# Patient Record
Sex: MALE | Race: WHITE | HISPANIC OR LATINO | Employment: FULL TIME | URBAN - METROPOLITAN AREA
[De-identification: names, ages, dates, MRNs, and addresses within clinical notes are randomized per-mention and may not be internally consistent; named-entity substitution may affect disease eponyms.]

---

## 2024-01-29 ENCOUNTER — OFFICE VISIT (OUTPATIENT)
Dept: FAMILY MEDICINE | Facility: CLINIC | Age: 54
End: 2024-01-29
Payer: COMMERCIAL

## 2024-01-29 VITALS
DIASTOLIC BLOOD PRESSURE: 91 MMHG | WEIGHT: 178 LBS | HEART RATE: 56 BPM | TEMPERATURE: 98 F | OXYGEN SATURATION: 96 % | RESPIRATION RATE: 18 BRPM | SYSTOLIC BLOOD PRESSURE: 143 MMHG

## 2024-01-29 DIAGNOSIS — R51.9 ACUTE NONINTRACTABLE HEADACHE, UNSPECIFIED HEADACHE TYPE: Primary | ICD-10-CM

## 2024-01-29 DIAGNOSIS — J06.9 VIRAL URI: ICD-10-CM

## 2024-01-29 PROCEDURE — 99203 OFFICE O/P NEW LOW 30 MIN: CPT | Performed by: PHYSICIAN ASSISTANT

## 2024-01-29 RX ORDER — ATORVASTATIN CALCIUM 10 MG/1
1 TABLET, FILM COATED ORAL AT BEDTIME
COMMUNITY
Start: 2023-06-20 | End: 2024-02-26

## 2024-01-29 RX ORDER — PANTOPRAZOLE SODIUM 20 MG/1
20 TABLET, DELAYED RELEASE ORAL DAILY
COMMUNITY
Start: 2023-06-26 | End: 2024-02-26

## 2024-01-29 RX ORDER — CHLORAL HYDRATE 500 MG
1000 CAPSULE ORAL
COMMUNITY

## 2024-01-29 RX ORDER — LISINOPRIL 5 MG/1
5 TABLET ORAL DAILY
COMMUNITY
Start: 2023-06-20 | End: 2024-02-26

## 2024-01-29 NOTE — PROGRESS NOTES
"Chief Complaint   Patient presents with    head pain     Pain in right side of head x2 weeks      Nasal Congestion     X3 days         ASSESSMENT/PLAN:  Amie was seen today for head pain and nasal congestion.    Diagnoses and all orders for this visit:    Acute nonintractable headache, unspecified headache type    Viral URI    Unclear cause of headache, considered trigeminal neuralgia, herpes, tension headache, migraine, mass among others.  He has a reassuring neuroexam at the moment.  Has not tried anything over-the-counter for it.  Otherwise normal exam.  Recommend Tylenol, ibuprofen, follow-up with PCP in 2 weeks if not improving, sooner if any new or worsening symptoms develop    Has a mild viral URI.  Symptomatic management recommended    Sabas Olivares PA-C      SUBJECTIVE:  Amie is a 53 year old male who presents to urgent care with pressure and the feeling of swelling in right side of the face. Symptoms started 2 weeks ago. Patient denies injury or pain stating \"its like swelling or a bruise on the side of my head\".  There has not been an asymmetry of the head.  No acute injury or trauma.  Patient denies fever, shortness of breath, cough, sore throat, or chest pain. Patient noted minor shortness of breath on Wednesday when playing soccer. Patient denies tingling, numbness, or ringing in the right ear. Patient also complains of nasal congestion that started yesterday (1/28/24).     ROS: Pertinent ROS neg other than the symptoms noted above in the HPI.     OBJECTIVE:  BP (!) 143/91 (BP Location: Right arm, Patient Position: Sitting, Cuff Size: Adult Large)   Pulse 56   Temp 98  F (36.7  C) (Oral)   Resp 18   Wt 80.7 kg (178 lb)   SpO2 96%    GENERAL: alert and no distress  EYES: Eyes grossly normal to inspection, PERRLA, no photophobia, extract movements intact  RESP: lungs clear to auscultation - no rales, rhonchi or wheezes  CV: regular rate and rhythm, normal S1 S2, no S3 or S4, no murmur, click or " rub, no peripheral edema   MS: no gross musculoskeletal defects noted, no edema. No swelling noted to face.  SKIN: no suspicious lesions or rashes, no tenderness of the head or neck  Neuro: Normal gait, cranial nerves II through XII intact, upper and lower EXTR strength 5/5    DIAGNOSTICS    No results found for any visits on 01/29/24.     Current Outpatient Medications   Medication    atorvastatin (LIPITOR) 10 MG tablet    fish oil-omega-3 fatty acids 1000 MG capsule    lisinopril (ZESTRIL) 5 MG tablet    pantoprazole (PROTONIX) 20 MG EC tablet     No current facility-administered medications for this visit.      There is no problem list on file for this patient.     No past medical history on file.  No past surgical history on file.  No family history on file.  Social History     Tobacco Use    Smoking status: Not on file    Smokeless tobacco: Not on file   Substance Use Topics    Alcohol use: Not on file              The plan of care was discussed with the patient. They understand and agree with the course of treatment prescribed. A printed summary was given including instructions and medications.  The use of Dragon/WineNice dictation services may have been used to construct the content in this note; any grammatical or spelling errors are non-intentional. Please contact the author of this note directly if you are in need of any clarification.

## 2024-02-19 SDOH — HEALTH STABILITY: PHYSICAL HEALTH: ON AVERAGE, HOW MANY MINUTES DO YOU ENGAGE IN EXERCISE AT THIS LEVEL?: 30 MIN

## 2024-02-19 SDOH — HEALTH STABILITY: PHYSICAL HEALTH: ON AVERAGE, HOW MANY DAYS PER WEEK DO YOU ENGAGE IN MODERATE TO STRENUOUS EXERCISE (LIKE A BRISK WALK)?: 5 DAYS

## 2024-02-19 ASSESSMENT — SOCIAL DETERMINANTS OF HEALTH (SDOH): HOW OFTEN DO YOU GET TOGETHER WITH FRIENDS OR RELATIVES?: ONCE A WEEK

## 2024-02-26 ENCOUNTER — OFFICE VISIT (OUTPATIENT)
Dept: FAMILY MEDICINE | Facility: CLINIC | Age: 54
End: 2024-02-26
Payer: COMMERCIAL

## 2024-02-26 VITALS
HEART RATE: 53 BPM | OXYGEN SATURATION: 96 % | TEMPERATURE: 98 F | DIASTOLIC BLOOD PRESSURE: 83 MMHG | SYSTOLIC BLOOD PRESSURE: 134 MMHG | HEIGHT: 65 IN | BODY MASS INDEX: 30.6 KG/M2 | WEIGHT: 183.7 LBS

## 2024-02-26 DIAGNOSIS — Z23 IMMUNIZATION DUE: ICD-10-CM

## 2024-02-26 DIAGNOSIS — R12 HEARTBURN: ICD-10-CM

## 2024-02-26 DIAGNOSIS — E78.2 MIXED HYPERLIPIDEMIA: ICD-10-CM

## 2024-02-26 DIAGNOSIS — Z11.4 SCREENING FOR HIV (HUMAN IMMUNODEFICIENCY VIRUS): ICD-10-CM

## 2024-02-26 DIAGNOSIS — Z00.00 ANNUAL PHYSICAL EXAM: Primary | ICD-10-CM

## 2024-02-26 DIAGNOSIS — I10 ESSENTIAL HYPERTENSION: ICD-10-CM

## 2024-02-26 DIAGNOSIS — Z11.59 NEED FOR HEPATITIS C SCREENING TEST: ICD-10-CM

## 2024-02-26 PROBLEM — M25.561 CHRONIC PAIN OF RIGHT KNEE: Status: RESOLVED | Noted: 2023-04-10 | Resolved: 2024-02-26

## 2024-02-26 PROBLEM — G89.29 CHRONIC PAIN OF RIGHT KNEE: Status: ACTIVE | Noted: 2023-04-10

## 2024-02-26 PROBLEM — M76.61 ACHILLES TENDINITIS OF RIGHT LOWER EXTREMITY: Status: RESOLVED | Noted: 2022-09-21 | Resolved: 2024-02-26

## 2024-02-26 PROBLEM — G89.29 CHRONIC PAIN OF RIGHT KNEE: Status: RESOLVED | Noted: 2023-04-10 | Resolved: 2024-02-26

## 2024-02-26 PROBLEM — M25.561 CHRONIC PAIN OF RIGHT KNEE: Status: ACTIVE | Noted: 2023-04-10

## 2024-02-26 PROBLEM — M76.61 ACHILLES TENDINITIS OF RIGHT LOWER EXTREMITY: Status: ACTIVE | Noted: 2022-09-21

## 2024-02-26 LAB
ALBUMIN SERPL BCG-MCNC: 4.6 G/DL (ref 3.5–5.2)
ALP SERPL-CCNC: 67 U/L (ref 40–150)
ALT SERPL W P-5'-P-CCNC: 44 U/L (ref 0–70)
ANION GAP SERPL CALCULATED.3IONS-SCNC: 9 MMOL/L (ref 7–15)
AST SERPL W P-5'-P-CCNC: 55 U/L (ref 0–45)
BASOPHILS # BLD AUTO: 0 10E3/UL (ref 0–0.2)
BASOPHILS NFR BLD AUTO: 1 %
BILIRUB SERPL-MCNC: 0.5 MG/DL
BUN SERPL-MCNC: 19.7 MG/DL (ref 6–20)
CALCIUM SERPL-MCNC: 9.5 MG/DL (ref 8.6–10)
CHLORIDE SERPL-SCNC: 105 MMOL/L (ref 98–107)
CHOLEST SERPL-MCNC: 225 MG/DL
CREAT SERPL-MCNC: 0.97 MG/DL (ref 0.67–1.17)
DEPRECATED HCO3 PLAS-SCNC: 27 MMOL/L (ref 22–29)
EGFRCR SERPLBLD CKD-EPI 2021: >90 ML/MIN/1.73M2
EOSINOPHIL # BLD AUTO: 0.5 10E3/UL (ref 0–0.7)
EOSINOPHIL NFR BLD AUTO: 8 %
ERYTHROCYTE [DISTWIDTH] IN BLOOD BY AUTOMATED COUNT: 12 % (ref 10–15)
FASTING STATUS PATIENT QL REPORTED: ABNORMAL
GLUCOSE SERPL-MCNC: 96 MG/DL (ref 70–99)
HCT VFR BLD AUTO: 44 % (ref 40–53)
HCV AB SERPL QL IA: NONREACTIVE
HDLC SERPL-MCNC: 50 MG/DL
HGB BLD-MCNC: 14.3 G/DL (ref 13.3–17.7)
HIV 1+2 AB+HIV1 P24 AG SERPL QL IA: NONREACTIVE
IMM GRANULOCYTES # BLD: 0 10E3/UL
IMM GRANULOCYTES NFR BLD: 0 %
LDLC SERPL CALC-MCNC: 155 MG/DL
LYMPHOCYTES # BLD AUTO: 1.8 10E3/UL (ref 0.8–5.3)
LYMPHOCYTES NFR BLD AUTO: 29 %
MCH RBC QN AUTO: 27.2 PG (ref 26.5–33)
MCHC RBC AUTO-ENTMCNC: 32.5 G/DL (ref 31.5–36.5)
MCV RBC AUTO: 84 FL (ref 78–100)
MONOCYTES # BLD AUTO: 0.5 10E3/UL (ref 0–1.3)
MONOCYTES NFR BLD AUTO: 8 %
NEUTROPHILS # BLD AUTO: 3.3 10E3/UL (ref 1.6–8.3)
NEUTROPHILS NFR BLD AUTO: 54 %
NONHDLC SERPL-MCNC: 175 MG/DL
PLATELET # BLD AUTO: 221 10E3/UL (ref 150–450)
POTASSIUM SERPL-SCNC: 5.2 MMOL/L (ref 3.4–5.3)
PROT SERPL-MCNC: 7.8 G/DL (ref 6.4–8.3)
PSA SERPL DL<=0.01 NG/ML-MCNC: 0.54 NG/ML (ref 0–3.5)
RBC # BLD AUTO: 5.26 10E6/UL (ref 4.4–5.9)
SODIUM SERPL-SCNC: 141 MMOL/L (ref 135–145)
TRIGL SERPL-MCNC: 100 MG/DL
TSH SERPL DL<=0.005 MIU/L-ACNC: 2.77 UIU/ML (ref 0.3–4.2)
WBC # BLD AUTO: 6.1 10E3/UL (ref 4–11)

## 2024-02-26 PROCEDURE — 90480 ADMN SARSCOV2 VAC 1/ONLY CMP: CPT | Performed by: FAMILY MEDICINE

## 2024-02-26 PROCEDURE — 36415 COLL VENOUS BLD VENIPUNCTURE: CPT | Performed by: FAMILY MEDICINE

## 2024-02-26 PROCEDURE — 84443 ASSAY THYROID STIM HORMONE: CPT | Performed by: FAMILY MEDICINE

## 2024-02-26 PROCEDURE — G0103 PSA SCREENING: HCPCS | Performed by: FAMILY MEDICINE

## 2024-02-26 PROCEDURE — 80053 COMPREHEN METABOLIC PANEL: CPT | Performed by: FAMILY MEDICINE

## 2024-02-26 PROCEDURE — 85025 COMPLETE CBC W/AUTO DIFF WBC: CPT | Performed by: FAMILY MEDICINE

## 2024-02-26 PROCEDURE — 86803 HEPATITIS C AB TEST: CPT | Performed by: FAMILY MEDICINE

## 2024-02-26 PROCEDURE — 87389 HIV-1 AG W/HIV-1&-2 AB AG IA: CPT | Performed by: FAMILY MEDICINE

## 2024-02-26 PROCEDURE — 90750 HZV VACC RECOMBINANT IM: CPT | Performed by: FAMILY MEDICINE

## 2024-02-26 PROCEDURE — 91320 SARSCV2 VAC 30MCG TRS-SUC IM: CPT | Performed by: FAMILY MEDICINE

## 2024-02-26 PROCEDURE — 99214 OFFICE O/P EST MOD 30 MIN: CPT | Mod: 25 | Performed by: FAMILY MEDICINE

## 2024-02-26 PROCEDURE — 80061 LIPID PANEL: CPT | Performed by: FAMILY MEDICINE

## 2024-02-26 PROCEDURE — 99396 PREV VISIT EST AGE 40-64: CPT | Mod: 25 | Performed by: FAMILY MEDICINE

## 2024-02-26 PROCEDURE — 90471 IMMUNIZATION ADMIN: CPT | Performed by: FAMILY MEDICINE

## 2024-02-26 RX ORDER — LISINOPRIL 5 MG/1
5 TABLET ORAL DAILY
Qty: 90 TABLET | Refills: 3 | Status: SHIPPED | OUTPATIENT
Start: 2024-02-26

## 2024-02-26 RX ORDER — PANTOPRAZOLE SODIUM 20 MG/1
20 TABLET, DELAYED RELEASE ORAL DAILY
Qty: 90 TABLET | Refills: 3 | Status: SHIPPED | OUTPATIENT
Start: 2024-02-26

## 2024-02-26 RX ORDER — ATORVASTATIN CALCIUM 10 MG/1
10 TABLET, FILM COATED ORAL AT BEDTIME
Qty: 90 TABLET | Refills: 3 | Status: SHIPPED | OUTPATIENT
Start: 2024-02-26

## 2024-02-26 ASSESSMENT — PAIN SCALES - GENERAL: PAINLEVEL: NO PAIN (0)

## 2024-02-26 NOTE — PROGRESS NOTES
Preventive Care Visit  [facility] TARA Weinberg MD, Family Medicine  Feb 26, 2024      SUBJECTIVE:   Amie is a 53 year old, presenting for the following:  Physical (Fasting, would like labs) and Establish Care (Moved from [place] to [place] in summer 2023. Has been skipping med doses due to lack of PCP to refill/)        2/26/2024     7:03 AM   Additional Questions   Roomed by Brianda Moulton, Visit Facilitator     HPI    Hypertension: Patient was started lisinopril 5 mg inconsistently because he is trying to stretch out the medication, as he is running low on doses.  His father and paternal grandparents all were diagnosed and treated for hypertension.  Patient lives a healthy life with regular exercise and healthy diet.    Hyperlipidemia: Patient was started on atorvastatin 10 mg last year for elevation in cholesterol.  LDL cholesterol was 156 before starting medication and improved to 118 about 2 weeks after starting atorvastatin.    GERD: Patient had an episode of esophageal dysphagia last year when trying to swallow a big piece of meat at work.  Patient was seen by GI, had an endoscopy done that showed inflammation in the esophagus from acid reflux and negative for Devlin's.  He was started on pantoprazole 20 mg, which has been working well to control symptoms and he has had no recurrence of esophageal dysphagia.      Exercise: Plays soccer every Wednesday and daily exercise with wife for 30-45 minutes online exercise classes.  Today's PHQ-2 Score:       2/25/2024     9:19 AM   PHQ-2 ( 1999 Pf[employer])   Q1: Little interest or pleasure in doing things 0   Q2: Feeling down, depressed or hopeless 0   PHQ-2 Score 0   Q1: Little interest or pleasure in doing things Not at all   Q2: Feeling down, depressed or hopeless Not at all   PHQ-2 Score 0     Via the Health Maintenance questionnaire, the patient has reported the following services have been completed -Colonscopy, this information has been sent to the  "abstraction team.          Have you ever done Advance Care Planning? (For example, a Health Directive, POLST, or a discussion with a medical provider or your loved ones about your wishes): No, advance care planning information given to patient to review.  Patient plans to discuss their wishes with loved ones or provider.      Social History     Tobacco Use    Smoking status: Never     Passive exposure: Past    Smokeless tobacco: Never   Substance Use Topics    Alcohol use: Yes     Alcohol/week: 2.0 standard drinks of alcohol     Types: 2 Glasses of wine per week     Comment: Only weekends, social             2/19/2024    12:34 PM   Alcohol Use   Prescreen: >3 drinks/day or >7 drinks/week? No     Last PSA:   Prostate Specific Antigen Screen   Date Value Ref Range Status   04/29/2016 0.6 0.0 - 2.5 ng/mL Final       Reviewed orders with patient. Reviewed health maintenance and updated orders accordingly - Yes      Reviewed and updated as needed this visit by clinical staff   Tobacco  Allergies  Meds  Problems  Med Hx  Surg Hx  Fam Hx  Soc   Hx        Reviewed and updated as needed this visit by Provider   Tobacco    Problems  Med Hx  Surg Hx  Fam Hx  Soc Hx Sexual Activity            Review of Systems    Review of Systems  Constitutional, HEENT, cardiovascular, pulmonary, gi and gu systems are negative, except as otherwise noted.    OBJECTIVE:   /83 (BP Location: Left arm, Patient Position: Sitting, Cuff Size: Adult Regular)   Pulse 53   Temp 98  F (36.7  C) (Oral)   Ht 1.651 m (5' 5\")   Wt 83.3 kg (183 lb 11.2 oz)   SpO2 96%   BMI 30.57 kg/m     Estimated body mass index is 30.57 kg/m  as calculated from the following:    Height as of this encounter: 1.651 m (5' 5\").    Weight as of this encounter: 83.3 kg (183 lb 11.2 oz).  Physical Exam  GENERAL: alert and no distress  EYES: Eyes grossly normal to inspection, PERRL and conjunctivae and sclerae normal  HENT: ear canals and TM's normal, nose " and mouth without ulcers or lesions  NECK: no adenopathy, no asymmetry, masses, or scars  RESP: lungs clear to auscultation - no rales, rhonchi or wheezes  CV: regular rate and rhythm, normal S1 S2, no S3 or S4, no murmur, click or rub, no peripheral edema  ABDOMEN: soft, nontender, no hepatosplenomegaly, no masses and bowel sounds normal  MS: no gross musculoskeletal defects noted, no edema  SKIN: no suspicious lesions or rashes  NEURO: Normal strength and tone, mentation intact and speech normal  PSYCH: mentation appears normal, affect normal/bright        ASSESSMENT/PLAN:   Annual physical exam  Encourage patient continue healthy lifestyle.  Check labs and notify with results.  Controlled.  Continue lisinopril 5 mg daily.  Follow-up annually or sooner with any concerns.  - HIV Antigen Antibody Combo; Future  - Hepatitis C Screen Reflex to HCV RNA Quant and Genotype; Future  - Lipid Profile; Future  - Comprehensive metabolic panel; Future  - Prostate Specific Antigen Screen; Future  - CBC with Platelets & Differential; Future  - TSH with free T4 reflex; Future    Screening for HIV (human immunodeficiency virus)  - HIV Antigen Antibody Combo; Future    Need for hepatitis C screening test  - Hepatitis C Screen Reflex to HCV RNA Quant and Genotype; Future    Essential hypertension  Controlled. Continue lisinopril 5 mg daily. Check labs and notify with results.  - lisinopril (ZESTRIL) 5 MG tablet; Take 1 tablet (5 mg) by mouth daily    Mixed hyperlipidemia  Controlled.  Continue atorvastatin 10 mg daily.  Check labs and notify with results.  - atorvastatin (LIPITOR) 10 MG tablet; Take 1 tablet (10 mg) by mouth at bedtime    Heartburn  Controlled.  Continue pantoprazole.  - pantoprazole (PROTONIX) 20 MG EC tablet; Take 1 tablet (20 mg) by mouth daily    Immunization due  - ZOSTER RECOMBINANT ADJUVANTED (SHINGRIX)  - COVID-19 12+ (2023-24) (PFIZER)    Patient has been advised of split billing requirements and indicates  understanding: Yes      Counseling  Reviewed preventive health counseling, as reflected in patient instructions       Regular exercise       Healthy diet/nutrition        He reports that he has never smoked. He has been exposed to tobacco smoke. He has never used smokeless tobacco.            Signed Electronically by: Misael Weinberg MD

## 2024-07-15 ENCOUNTER — ANCILLARY PROCEDURE (OUTPATIENT)
Dept: GENERAL RADIOLOGY | Facility: CLINIC | Age: 54
End: 2024-07-15
Attending: FAMILY MEDICINE
Payer: COMMERCIAL

## 2024-07-15 ENCOUNTER — OFFICE VISIT (OUTPATIENT)
Dept: FAMILY MEDICINE | Facility: CLINIC | Age: 54
End: 2024-07-15
Payer: COMMERCIAL

## 2024-07-15 VITALS
DIASTOLIC BLOOD PRESSURE: 81 MMHG | OXYGEN SATURATION: 96 % | HEIGHT: 65 IN | WEIGHT: 183 LBS | TEMPERATURE: 98 F | SYSTOLIC BLOOD PRESSURE: 134 MMHG | HEART RATE: 50 BPM | RESPIRATION RATE: 16 BRPM | BODY MASS INDEX: 30.49 KG/M2

## 2024-07-15 DIAGNOSIS — R07.81 RIB PAIN ON LEFT SIDE: Primary | ICD-10-CM

## 2024-07-15 DIAGNOSIS — M25.562 ACUTE PAIN OF LEFT KNEE: ICD-10-CM

## 2024-07-15 DIAGNOSIS — R07.81 RIB PAIN ON LEFT SIDE: ICD-10-CM

## 2024-07-15 PROCEDURE — G2211 COMPLEX E/M VISIT ADD ON: HCPCS | Performed by: FAMILY MEDICINE

## 2024-07-15 PROCEDURE — 71101 X-RAY EXAM UNILAT RIBS/CHEST: CPT | Mod: TC | Performed by: RADIOLOGY

## 2024-07-15 PROCEDURE — 99213 OFFICE O/P EST LOW 20 MIN: CPT | Performed by: FAMILY MEDICINE

## 2024-07-15 ASSESSMENT — PAIN SCALES - GENERAL: PAINLEVEL: EXTREME PAIN (8)

## 2024-07-15 NOTE — PROGRESS NOTES
"  Assessment & Plan     Rib pain on left side  Possible rib contusion versus rib fracture.  Check x-ray to evaluate for fracture.  Advised patient continue ibuprofen for pain relief.  - XR Ribs & Chest Left G/E 3 Views; Future    Acute pain of left knee  New problem.  Possible lateral meniscus injury.  Home exercises given for initial treatment.  If patient is not improving we could consider physical therapy referral and/or orthopedic referral depending on symptoms.      The longitudinal plan of care for the diagnosis(es)/condition(s) as documented were addressed during this visit. Due to the added complexity in care, I will continue to support Amie in the subsequent management and with ongoing continuity of care.        BMI  Estimated body mass index is 30.45 kg/m  as calculated from the following:    Height as of this encounter: 1.651 m (5' 5\").    Weight as of this encounter: 83 kg (183 lb).   Weight management plan: Discussed healthy diet and exercise guidelines          Subjective   Amie is a 53 year old, presenting for the following health issues:  Amie was playing soccer this past Thursday when he was hit, and elbowed in the left ribs by an opposing player while challenging a goal. Amie states he got up immediately after impact, but then had trouble breathing so he went down afterward. Amie states he was checked during the game by trainers, and it didn't seem like there was a rib fracture, although he has had difficulty exercising due to shortness of breath and difficulty sleeping due to pain.  He has been using over-the-counter ibuprofen, lidocaine patches, Salonpas and Biofreeze, which have been helpful.  Patches did cause a skin reaction.    Additionally, this past Friday Amie was hit on left knee during a game when an opposing player slide tackled in lateral left knee. He states that he is limping a little when walking.  Patient feels like his left knee is swollen and there is anterior lateral pain. " " Amie tried using Biofreeze and ibuprofen, which have been helpful for symptoms.  He denies any knee locking with movement.    Injury (Patient was playing soccer and collided with another player. Other player's elbow hit patients left side of abdomen - experiencing rib pain, hurts to lie down. Left knee pain - worse when walking down stairs. Patient was using BioFreeze patched on left side/back, skin is reacting poorly, hurts to touch, swollen. ) and Shortness of Breath (Shortness of breath when walking, difficulty taking a deep breath)        7/15/2024     9:36 AM   Additional Questions   Roomed by [facility], Visit Facilitator   Accompanied by Wife - Tabatha     History of Present Illness       Reason for visit:  Hurt ribs and knees                Review of Systems  No loss of consciousness, no dizziness, no fever      Objective    /81 (BP Location: Left arm, Patient Position: Sitting, Cuff Size: Adult Regular)   Pulse 50   Temp 98  F (36.7  C) (Oral)   Resp 16   Ht 1.651 m (5' 5\")   Wt 83 kg (183 lb)   SpO2 96%   BMI 30.45 kg/m    Body mass index is 30.45 kg/m .  Physical Exam   GENERAL: alert and no distress  RESP: lungs clear to auscultation - no rales, rhonchi or wheezes  LEFT KNEE: Full range of movement with 5 out of 5 strength, mild lateral effusion, negative anterior drawer, negative grind test, negative pain with medial and lateral movement, mild tenderness to palpation of left anterior lateral knee over lateral meniscus.  SKIN: Tenderness palpation over the left lower ribs with flat erythematous skin rash in the shape of a patch.  PSYCH: mentation appears normal, affect normal/bright            Signed Electronically by: Misael Weinberg MD    "

## 2024-07-19 ENCOUNTER — ANCILLARY PROCEDURE (OUTPATIENT)
Dept: GENERAL RADIOLOGY | Facility: CLINIC | Age: 54
End: 2024-07-19
Attending: FAMILY MEDICINE
Payer: COMMERCIAL

## 2024-07-19 DIAGNOSIS — R07.81 RIB PAIN ON LEFT SIDE: ICD-10-CM

## 2024-07-19 PROCEDURE — 71101 X-RAY EXAM UNILAT RIBS/CHEST: CPT | Mod: TC | Performed by: RADIOLOGY

## 2024-08-13 ENCOUNTER — MYC MEDICAL ADVICE (OUTPATIENT)
Dept: FAMILY MEDICINE | Facility: CLINIC | Age: 54
End: 2024-08-13
Payer: COMMERCIAL

## 2024-08-13 NOTE — TELEPHONE ENCOUNTER
08/12/24  LM for pt that no referral is needed for sports medicine. Pt can call [phone] to schedule.  Dianne

## 2024-08-16 NOTE — PROGRESS NOTES
"ASSESSMENT & PLAN  Patient Instructions     1. Acute pain of left knee    2. Patellofemoral syndrome of left knee        -Patient seen for his left knee pain following an injury couple weeks ago.  He has tried OTC treatments with little improvement.  His x-rays demonstrate no acute fracture or dislocation.  We discussed that his symptoms are primarily within the patellofemoral joint and at this time would recommend physical therapy to work on strengthening his knee joint and muscles of his leg.  -We discussed he can continue to take ibuprofen and ice for swelling and pain.  He should continue to use his brace as needed.  We did discuss to reduce his high impact activities at this time to avoid overuse syndrome of his knee.  -As he progresses with therapy he can gradually return to all activities as tolerating.  -Follow-up in sports medicine should symptoms not improve or worsen.    -Call direct clinic number [[phone]] at any time with questions or concerns.    -Orthopedic Walk in Monday through Thursday 8 am to 6 pm, Friday 8 am to 5 pm, Saturday 8 am to 12 pm at [address] [place].        Yulia Luevano PA-C  [facility]    -----  Chief Complaint   Patient presents with    Left Knee - Pain       SUBJECTIVE  Amie Olsen is a/an 53 year old right-handed male who is seen as a self referral for evaluation of left knee pain.  Onset was 3-4 weeks it has been worse, but may have started after a soccer tournament in July. Pain is located left knee, mostly anterior. Symptoms are worsened by stairs.  He has tried ice, ibuprofen. Associated symptoms include catching, giving out,  weakness .    The patient is seen alone    Prior injury/Surgical history of affected joint: none  Social History/Occupation: Trade [employer]    REVIEW OF SYSTEMS:  Pertinent positives/negative: As stated above in HPI    OBJECTIVE:  BP (!) 150/96   Ht 1.651 m (5' 5\")   Wt 83 " kg (183 lb)   BMI 30.45 kg/m     General: Alert and in no distress  Skin: no visable rashes  CV: Extremities appear well perfused   Resp: normal respiratory effort, no conversational dyspnea   Psych: normal mood, affect  MSK: LEFT KNEE    Inspection:  There is no evidence of open wounds.   There is no evidence of erythema or infection  There is no appreciable swelling or synovitis.    Palpation:  There is no palpable fluctuance  There is tenderness to palpation at patella  There is no tenderness to palpation at medial/lateral joint, quadriceps, hamstrings    Strength:  Knee flexion: 5/5  Knee extension: 5/5  Extensor mechanism intact    Sensory:  Intact to light touch in the lower extremity bilaterally     Dorsalis pedis pulse is palpable and equal to contralateral side    Range of Motion:  Flexion: 130 degrees  Extension: 0 degrees    Examination Maneuvers:  Lissa's: -  Anterior drawer: -  Posterior drawer: -    Stable to varus and valgus stress at 0 and 30 degrees of flexion         RADIOLOGY:  Final results and radiologist's interpretation available in the [facility] health record.  Images below were personally reviewed and discussed with the patient in the office today.  My personal interpretation of the performed imaging: Left Deacon demonstrated mild degenerative changes.  No acute fracture or dislocation seen.

## 2024-08-17 ENCOUNTER — OFFICE VISIT (OUTPATIENT)
Dept: ORTHOPEDICS | Facility: CLINIC | Age: 54
End: 2024-08-17
Payer: COMMERCIAL

## 2024-08-17 ENCOUNTER — ANCILLARY PROCEDURE (OUTPATIENT)
Dept: GENERAL RADIOLOGY | Facility: CLINIC | Age: 54
End: 2024-08-17
Attending: PHYSICIAN ASSISTANT
Payer: COMMERCIAL

## 2024-08-17 VITALS
BODY MASS INDEX: 30.49 KG/M2 | HEIGHT: 65 IN | SYSTOLIC BLOOD PRESSURE: 150 MMHG | WEIGHT: 183 LBS | DIASTOLIC BLOOD PRESSURE: 96 MMHG

## 2024-08-17 DIAGNOSIS — M22.2X2 PATELLOFEMORAL SYNDROME OF LEFT KNEE: ICD-10-CM

## 2024-08-17 DIAGNOSIS — M25.562 KNEE PAIN, LEFT: ICD-10-CM

## 2024-08-17 DIAGNOSIS — M25.562 ACUTE PAIN OF LEFT KNEE: Primary | ICD-10-CM

## 2024-08-17 PROCEDURE — 99203 OFFICE O/P NEW LOW 30 MIN: CPT | Performed by: PHYSICIAN ASSISTANT

## 2024-08-17 PROCEDURE — 73562 X-RAY EXAM OF KNEE 3: CPT | Mod: TC | Performed by: RADIOLOGY

## 2024-08-17 NOTE — PATIENT INSTRUCTIONS
1. Acute pain of left knee    2. Patellofemoral syndrome of left knee        -Patient seen for his left knee pain following an injury couple weeks ago.  He has tried OTC treatments with little improvement.  His x-rays demonstrate no acute fracture or dislocation.  We discussed that his symptoms are primarily within the patellofemoral joint and at this time would recommend physical therapy to work on strengthening his knee joint and muscles of his leg.  -We discussed he can continue to take ibuprofen and ice for swelling and pain.  He should continue to use his brace as needed.  We did discuss to reduce his high impact activities at this time to avoid overuse syndrome of his knee.  -As he progresses with therapy he can gradually return to all activities as tolerating.  -Follow-up in sports medicine should symptoms not improve or worsen.    -Call direct clinic number [[phone]] at any time with questions or concerns.    -Orthopedic Walk in Monday through Thursday 8 am to 6 pm, Friday 8 am to 5 pm, Saturday 8 am to 12 pm at [address] [address].

## 2024-08-17 NOTE — LETTER
8/17/2024      Amie Olsen  [address] [facility] [address]      Dear Colleague,    Thank you for referring your patient, Amie Olsen, to the [facility] [place]. Please see a copy of my visit note below.    ASSESSMENT & PLAN  There are no Patient Instructions on file for this visit.      Yulia Luevano PA-C  [facility]    -----  No chief complaint on file.      SUBJECTIVE  Amie Olsen is a/an 53 year old right-handed male who is seen as a self referral for evaluation of left knee pain.  Onset was 3-4 weeks it has been worse, but may have started after a soccer tournament in July. Pain is located left knee, mostly anterior. Symptoms are worsened by stairs.  He has tried ice, ibuprofen. Associated symptoms include catching, giving out,  weakness .    The patient is seen alone    Prior injury/Surgical history of affected joint: none  Social History/Occupation: Trade [employer]    REVIEW OF SYSTEMS:  Pertinent positives/negative: As stated above in HPI    OBJECTIVE:  There were no vitals taken for this visit.   General: Alert and in no distress  Skin: no visable rashes  CV: Extremities appear well perfused   Resp: normal respiratory effort, no conversational dyspnea   Psych: normal mood, affect  MSK: LEFT KNEE    Inspection:  There is no evidence of open wounds.   There is no evidence of erythema or infection  There is no appreciable swelling or synovitis.    Palpation:  There is no palpable fluctuance  There is tenderness to palpation at patella  There is no tenderness to palpation at medial/lateral joint, quadriceps, hamstrings    Strength:  Knee flexion: 5/5  Knee extension: 5/5  Extensor mechanism intact    Sensory:  Intact to light touch in the lower extremity bilaterally     Dorsalis pedis pulse is palpable and equal to contralateral side    Range of Motion:  Flexion: 130 degrees  Extension: 0 degrees    Examination  Maneuvers:  Lissa's: -  Anterior drawer: -  Posterior drawer: -    Stable to varus and valgus stress at 0 and 30 degrees of flexion         RADIOLOGY:  Final results and radiologist's interpretation available in the [facility] health record.  Images below were personally reviewed and discussed with the patient in the office today.  My personal interpretation of the performed imaging: Left Deacon demonstrated mild degenerative changes.  No acute fracture or dislocation seen.           Again, thank you for allowing me to participate in the care of your patient.        Sincerely,        Yulia Luevano PA-C

## 2024-09-01 ASSESSMENT — ACTIVITIES OF DAILY LIVING (ADL)
AS_A_RESULT_OF_YOUR_KNEE_INJURY,_HOW_WOULD_YOU_RATE_YOUR_CURRENT_LEVEL_OF_DAILY_ACTIVITY?: ABNORMAL
SIT WITH YOUR KNEE BENT: ACTIVITY IS MINIMALLY DIFFICULT
WALK: ACTIVITY IS NOT DIFFICULT
LIMPING: THE SYMPTOM AFFECTS MY ACTIVITY SLIGHTLY
HOW_WOULD_YOU_RATE_THE_OVERALL_FUNCTION_OF_YOUR_KNEE_DURING_YOUR_USUAL_DAILY_ACTIVITIES?: NEARLY NORMAL
GIVING WAY, BUCKLING OR SHIFTING OF KNEE: THE SYMPTOM AFFECTS MY ACTIVITY SLIGHTLY
SWELLING: THE SYMPTOM AFFECTS MY ACTIVITY SLIGHTLY
KNEE_ACTIVITY_OF_DAILY_LIVING_SUM: 50
WEAKNESS: THE SYMPTOM AFFECTS MY ACTIVITY MODERATELY
PLEASE_INDICATE_YOR_PRIMARY_REASON_FOR_REFERRAL_TO_THERAPY:: KNEE
RISE FROM A CHAIR: ACTIVITY IS NOT DIFFICULT
GO DOWN STAIRS: ACTIVITY IS SOMEWHAT DIFFICULT
PAIN: I HAVE THE SYMPTOM BUT IT DOES NOT AFFECT MY ACTIVITY
SQUAT: ACTIVITY IS MINIMALLY DIFFICULT
STIFFNESS: THE SYMPTOM AFFECTS MY ACTIVITY MODERATELY
HOW_WOULD_YOU_RATE_THE_CURRENT_FUNCTION_OF_YOUR_KNEE_DURING_YOUR_USUAL_DAILY_ACTIVITIES_ON_A_SCALE_FROM_0_TO_100_WITH_100_BEING_YOUR_LEVEL_OF_KNEE_FUNCTION_PRIOR_TO_YOUR_INJURY_AND_0_BEING_THE_INABILITY_TO_PERFORM_ANY_OF_YOUR_USUAL_DAILY_ACTIVITIES?: 60
STAND: ACTIVITY IS NOT DIFFICULT
RAW_SCORE: 50
KNEEL ON THE FRONT OF YOUR KNEE: ACTIVITY IS MINIMALLY DIFFICULT
GO UP STAIRS: ACTIVITY IS SOMEWHAT DIFFICULT
KNEE_ACTIVITY_OF_DAILY_LIVING_SCORE: 71.43

## 2024-09-06 ENCOUNTER — THERAPY VISIT (OUTPATIENT)
Dept: PHYSICAL THERAPY | Facility: REHABILITATION | Age: 54
End: 2024-09-06
Attending: PHYSICIAN ASSISTANT
Payer: COMMERCIAL

## 2024-09-06 DIAGNOSIS — M22.2X2 PATELLOFEMORAL SYNDROME OF LEFT KNEE: ICD-10-CM

## 2024-09-06 DIAGNOSIS — M25.562 ACUTE PAIN OF LEFT KNEE: ICD-10-CM

## 2024-09-06 PROCEDURE — 97110 THERAPEUTIC EXERCISES: CPT | Mod: GP

## 2024-09-06 PROCEDURE — 97161 PT EVAL LOW COMPLEX 20 MIN: CPT | Mod: GP

## 2024-09-06 NOTE — PROGRESS NOTES
"PHYSICAL THERAPY EVALUATION  Type of Visit: Evaluation       Fall Risk Screen:  Fall screen completed by: PT  Have you fallen 2 or more times in the past year?: No  Have you fallen and had an injury in the past year?: No  Is patient a fall risk?: No    Subjective       Amie reports that he developed left knee pain about 12 years ago without any mechanism of injury. He used ice and ibuprofen without any formal treatment, and it improved. He has had variable and intermittent pain since then, and it has gotten worse over the last couple of months. Kneeling doesn't bother him, but he reports that sometimes his knee \"gives out\" while navigating stairs and with extended walking. He denies any popping, clicking, or catching. Worst pain: 7/10. Best pain: 2-3/10. Current pain: 4-5/10. He also reports that while pivoting and cutting while playing soccer he feels some discomfort and instability. He started to use a compression sleeve which he feels helps as well. He was referred to physical therapy by sports medicine. He denies any numbness and tingling.  Presenting condition or subjective complaint: Patellofemoral syndrome  Date of onset: 07/12/24    Relevant medical history: Heart problems   Dates & types of surgery: None    Prior diagnostic imaging/testing results: X-ray     Narrative & Impression   IMPRESSION:   No acute displaced knee fracture or dislocation. Mild bilateral joint space narrowing in the medial compartments of the knees. Largely preserved lateral and patellofemoral compartment joint spaces on each side. No anterior knee soft tissue swelling. No   left knee joint effusion.     Prior therapy history for the same diagnosis, illness or injury: No      Prior Level of Function  Transfers: Independent  Ambulation: Independent  ADL: Independent  IADL:  independent    Living Environment  Social support: With a significant other or spouse   Type of home: House   Stairs to enter the home: No       Ramp: No   Stairs " inside the home: Yes 16 Is there a railing: Yes     Help at home: None  Equipment owned:       Employment: Yes Lead merchant - [employer]  Hobbies/Interests: Run, soccer, workout    Patient goals for therapy: Run and play soccer as usual    Pain assessment:  see subjective report     Objective   KNEE EVALUATION  PAIN: see subjective report  INTEGUMENTARY (edema, incisions):   POSTURE:   GAIT:  Weightbearing Status: WBAT  Assistive Device(s): None  Gait Deviations: Antalgic  Very mild favoring of left side  BALANCE/PROPRIOCEPTION:   WEIGHTBEARING ALIGNMENT:   NON-WEIGHTBEARING ALIGNMENT:   ROM:   (Degrees) Left AROM Left PROM  Right AROM Right PROM   Knee Flexion  WNL  WNL   Knee Extension Min limited (painful)  WNL    Pain:   End feel:     STRENGTH:   Pain: - none + mild ++ moderate +++ severe  Strength Scale: 0-5/5 Left Right   Knee Flexion 5 5   Knee Extension 5 5   Quad Set     - Hip abduction/adduction: 5/5 bilaterally  - Hip flexion: left 5-/5, right 5/5  - Hip extension: 4+/5 bilaterally  FLEXIBILITY: Decreased piriformis L, Decreased quadriceps L, Decreased hamstrings L, Decreased piriformis R, Decreased quadriceps R, Decreased hamstrings R  SPECIAL TESTS:   FUNCTIONAL TESTS: Double Leg Squat: Anterior knee translation, Increased trunk lean, Improper use of glutes/hips, and Able to perform full deep squat without pain  Single Leg Squat: Anterior knee translation, Knee valgus, Hip internal rotation, Increased trunk lean, and Improper use of glutes/hips  PALPATION:  Tender at left quadriceps tendon and bilateral distal quadriceps; no tenderness at MCL/LCL or bilateral medial/lateral joint lines  JOINT MOBILITY:     Assessment & Plan   CLINICAL IMPRESSIONS  Medical Diagnosis: Acute pain of left knee  Patellofemoral syndrome of left knee    Treatment Diagnosis: Subacute on chronic left knee pain with strength and mobility deficits   Impression/Assessment: Patient is a 53 year old male with subacute on  chronic left knee pain complaints.  The following significant findings have been identified: Pain, Decreased ROM/flexibility, Impaired gait, Impaired muscle performance, and Decreased activity tolerance. These impairments interfere with their ability to perform recreational activities, household chores, and community mobility as compared to previous level of function.     Clinical Decision Making (Complexity):  Clinical Presentation: Stable/Uncomplicated  Clinical Presentation Rationale: based on medical and personal factors listed in PT evaluation  Clinical Decision Making (Complexity): Low complexity    PLAN OF CARE  Treatment Interventions:  Modalities: Cryotherapy, E-stim, Hot Pack  Interventions: Manual Therapy, Neuromuscular Re-education, Therapeutic Activity, Therapeutic Exercise, Self-Care/Home Management    Long Term Goals     PT Goal 1  Goal Identifier: HEP  Goal Description: Amie will demonstrate mastery of ([id number] need for cueing) and compliance with (completion at least 5/7 days/week) his home exercise program to facilitate increased rate of improvement.  Goal Progress: In progress  Target Date: 10/04/24  PT Goal 2  Goal Identifier: KOS  Goal Description: Amie will demonstrate significantly improved function as evidenced by an increased KOS score of at least 85%.  Goal Progress: 71.43%  Target Date: 11/01/24  PT Goal 3  Goal Identifier: Soccer  Goal Description: Amie will demonstrate improved tolerance to desired recreational activities as evidenced by his ability to play soccer for up to 60 minutes with self-report left knee pain no higher than 2/10.  Goal Progress: Unable  Target Date: 11/01/24      Frequency of Treatment: 1 time per week to 1 time every other week  Duration of Treatment: 8 weeks    Recommended Referrals to Other Professionals:  none  Education Assessment:   Learner/Method: Patient;Listening;Demonstration;Pictures/Video;No Barriers to Learning    Risks and benefits of  evaluation/treatment have been explained.   Patient/Family/caregiver agrees with Plan of Care.     Evaluation Time:     PT Poppy, Low Complexity Minutes (97233): 19       Signing Clinician: Barry Rendon PT

## 2024-09-13 ENCOUNTER — THERAPY VISIT (OUTPATIENT)
Dept: PHYSICAL THERAPY | Facility: REHABILITATION | Age: 54
End: 2024-09-13
Attending: PHYSICIAN ASSISTANT
Payer: COMMERCIAL

## 2024-09-13 DIAGNOSIS — M22.2X2 PATELLOFEMORAL SYNDROME OF LEFT KNEE: ICD-10-CM

## 2024-09-13 DIAGNOSIS — M25.562 ACUTE PAIN OF LEFT KNEE: Primary | ICD-10-CM

## 2024-09-13 PROCEDURE — 97140 MANUAL THERAPY 1/> REGIONS: CPT | Mod: GP

## 2024-09-13 PROCEDURE — 97110 THERAPEUTIC EXERCISES: CPT | Mod: GP

## 2024-09-20 ENCOUNTER — THERAPY VISIT (OUTPATIENT)
Dept: PHYSICAL THERAPY | Facility: REHABILITATION | Age: 54
End: 2024-09-20
Attending: PHYSICIAN ASSISTANT
Payer: COMMERCIAL

## 2024-09-20 DIAGNOSIS — M25.562 ACUTE PAIN OF LEFT KNEE: Primary | ICD-10-CM

## 2024-09-20 DIAGNOSIS — M22.2X2 PATELLOFEMORAL SYNDROME OF LEFT KNEE: ICD-10-CM

## 2024-09-20 PROCEDURE — 97112 NEUROMUSCULAR REEDUCATION: CPT | Mod: GP

## 2024-09-20 PROCEDURE — 97110 THERAPEUTIC EXERCISES: CPT | Mod: GP

## 2024-09-20 PROCEDURE — 97140 MANUAL THERAPY 1/> REGIONS: CPT | Mod: GP

## 2024-09-27 ENCOUNTER — THERAPY VISIT (OUTPATIENT)
Dept: PHYSICAL THERAPY | Facility: REHABILITATION | Age: 54
End: 2024-09-27
Payer: COMMERCIAL

## 2024-09-27 DIAGNOSIS — M25.562 ACUTE PAIN OF LEFT KNEE: Primary | ICD-10-CM

## 2024-09-27 DIAGNOSIS — M22.2X2 PATELLOFEMORAL SYNDROME OF LEFT KNEE: ICD-10-CM

## 2024-09-27 PROCEDURE — 97110 THERAPEUTIC EXERCISES: CPT | Mod: GP

## 2024-09-27 PROCEDURE — 97112 NEUROMUSCULAR REEDUCATION: CPT | Mod: GP

## 2024-10-11 ENCOUNTER — THERAPY VISIT (OUTPATIENT)
Dept: PHYSICAL THERAPY | Facility: REHABILITATION | Age: 54
End: 2024-10-11
Payer: COMMERCIAL

## 2024-10-11 DIAGNOSIS — M25.562 ACUTE PAIN OF LEFT KNEE: Primary | ICD-10-CM

## 2024-10-11 DIAGNOSIS — M22.2X2 PATELLOFEMORAL SYNDROME OF LEFT KNEE: ICD-10-CM

## 2024-10-11 PROCEDURE — 97110 THERAPEUTIC EXERCISES: CPT | Mod: GP

## 2024-10-11 PROCEDURE — 97112 NEUROMUSCULAR REEDUCATION: CPT | Mod: GP

## 2024-10-11 ASSESSMENT — ACTIVITIES OF DAILY LIVING (ADL)
RISE FROM A CHAIR: ACTIVITY IS NOT DIFFICULT
AS_A_RESULT_OF_YOUR_KNEE_INJURY,_HOW_WOULD_YOU_RATE_YOUR_CURRENT_LEVEL_OF_DAILY_ACTIVITY?: NEARLY NORMAL
WALK: ACTIVITY IS NOT DIFFICULT
SQUAT: ACTIVITY IS NOT DIFFICULT
STIFFNESS: I DO NOT HAVE THE SYMPTOM
LIMPING: I DO NOT HAVE THE SYMPTOM
SWELLING: I HAVE THE SYMPTOM BUT IT DOES NOT AFFECT MY ACTIVITY
KNEE_ACTIVITY_OF_DAILY_LIVING_SUM: 66
HOW_WOULD_YOU_RATE_THE_OVERALL_FUNCTION_OF_YOUR_KNEE_DURING_YOUR_USUAL_DAILY_ACTIVITIES?: NORMAL
RAW_SCORE: 66
STAND: ACTIVITY IS NOT DIFFICULT
PAIN: I HAVE THE SYMPTOM BUT IT DOES NOT AFFECT MY ACTIVITY
SIT WITH YOUR KNEE BENT: ACTIVITY IS NOT DIFFICULT
GO DOWN STAIRS: ACTIVITY IS MINIMALLY DIFFICULT
WEAKNESS: I HAVE THE SYMPTOM BUT IT DOES NOT AFFECT MY ACTIVITY
HOW_WOULD_YOU_RATE_THE_CURRENT_FUNCTION_OF_YOUR_KNEE_DURING_YOUR_USUAL_DAILY_ACTIVITIES_ON_A_SCALE_FROM_0_TO_100_WITH_100_BEING_YOUR_LEVEL_OF_KNEE_FUNCTION_PRIOR_TO_YOUR_INJURY_AND_0_BEING_THE_INABILITY_TO_PERFORM_ANY_OF_YOUR_USUAL_DAILY_ACTIVITIES?: 70
GIVING WAY, BUCKLING OR SHIFTING OF KNEE: I DO NOT HAVE THE SYMPTOM
KNEE_ACTIVITY_OF_DAILY_LIVING_SCORE: 94.29
KNEEL ON THE FRONT OF YOUR KNEE: ACTIVITY IS NOT DIFFICULT
GO UP STAIRS: ACTIVITY IS NOT DIFFICULT

## 2024-10-11 NOTE — PROGRESS NOTES
DISCHARGE  Reason for Discharge: Patient has met or progressed toward all goals and he now chooses to discharge to a home exercise/maintenance program.    Equipment Issued: N/A    Discharge Plan: Patient to continue home program.    Referring Provider:  Yulia Luevano           10/11/24 0500   Appointment Info   Signing clinician's name / credentials Barry Rendon, PT   Visits Used 5   Medical Diagnosis Acute pain of left knee  Patellofemoral syndrome of left knee   PT Tx Diagnosis Subacute on chronic left knee pain with strength and mobility deficits   Progress Note/Certification   Start of Care Date 09/06/24   Onset of illness/injury or Date of Surgery 07/12/24   Therapy Frequency 1 time per week to 1 time every other week   Predicted Duration 8 weeks   Progress Note Due Date 10/04/24   PT Goal 1   Goal Identifier HEP   Goal Description Amie will demonstrate mastery of ([id number] need for cueing) and compliance with (completion at least 5/7 days/week) his home exercise program to facilitate increased rate of improvement.   Goal Progress Daily   Target Date 10/04/24   Date Met 10/11/24   PT Goal 2   Goal Identifier KOS   Goal Description Amie will demonstrate significantly improved function as evidenced by an increased KOS score of at least 85%.   Goal Progress 94.29%   Target Date 11/01/24   Date Met 10/11/24   PT Goal 3   Goal Identifier Soccer   Goal Description Amie will demonstrate improved tolerance to desired recreational activities as evidenced by his ability to play soccer for up to 60 minutes with self-report left knee pain no higher than 2/10.   Goal Progress Still not attempted; will trial in a couple of weeks   Target Date 11/01/24   Subjective Report   Subjective Report Amie returns to physical therapy reporting approximately 70% improvement overall, specifically noting decreased pain overall and apprehension/pain with stairs. He now is able to navigate stairs with reciprocal  "step-through pattern without any limitation. He also notes increased ease with and tolerance to his home exercises.   Objective Measures   Objective Measures Objective Measure 1;Objective Measure 2;Objective Measure 3   Objective Measure 1   Objective Measure Worst Pain   Details Since last visit: 4/10   Objective Measure 2   Objective Measure Left Knee Extension AROM   Details WNL and symmetric (tight; not painful)  (Right knee extension AROM: WNL)   Objective Measure 3   Objective Measure Hip Extension Strength   Details Left: 5-/5. Right: 5/5.   Therapeutic Procedure/Exercise   Therapeutic Procedures: strength, endurance, ROM, flexibility minutes (44309) 13   Ther Proc 1 Upright bike + subjective report   Ther Proc 1 - Details 5.5 minutes. See subjective report   Ther Proc 2 Objective measures   Ther Proc 2 - Details See objective measures.   Ther Proc 3 Discharge planning and discussion regarding continuation of HEP   Patient Response/Progress Amie tolerated all exercises and progressions well without increased symptoms and with cueing for proper form.   Neuromuscular Re-education   Neuromuscular re-ed of mvmt, balance, coord, kinesthetic sense, posture, proprioception minutes (52541) 8   Neuro Re-ed 1 Heel-taps   Neuro Re-ed 1 - Details Off 6-inch step: 2 x 12 bilteral (continued cueing to control anterior knee translation and valgus/varus)   Neuro Re-ed 2 Single-leg ball toss   Neuro Re-ed 2 - Details Foam pad: 3 x 30 seconds bilateral (intermittent stepping strategy to stabilize)   Skilled Intervention Exercises to increase coordination and knee control   Patient Response/Progress Added ball toss; progressed heel-taps. Amie tolerated all exercises well without increased symptoms and with cueing as noted.   Education   Learner/Method Patient;Listening;Demonstration;Pictures/Video;No Barriers to Learning   Education Comments Patient asked about some right ankle mild pain and \"clicking\" with a history of ankle " sprains. Therapist told him that   Plan   Home program See PTRx.   Plan for next session Continue to progress hip and lower extremity flexibility and strength/stability exercises as tolerated.  Trial manual therapy as appropriate.   Comments   Comments Impression/Assessment: Amie returns to physical therapy reporting that he feels ready to discharge to a home maintenance/exercise program, approximating 70% improvement overall since start of care. He demonstrates improved left knee AROM and hip extension strength on testing today, and he also reports notably improved tolerance to stair navigation, kneeling, and home exercise performance. He has not yet trialed soccer, but he said that he will be playing in a couple of weeks. He has improved his KOS score to 94.29%, indicating significantly improved function. He will now be discharged to a home exercise/maintenance program.   Total Session Time   Timed Code Treatment Minutes 21   Total Treatment Time (sum of timed and untimed services) 21

## 2025-01-27 ENCOUNTER — PATIENT OUTREACH (OUTPATIENT)
Dept: CARE COORDINATION | Facility: CLINIC | Age: 55
End: 2025-01-27
Payer: COMMERCIAL

## 2025-02-10 ENCOUNTER — PATIENT OUTREACH (OUTPATIENT)
Dept: CARE COORDINATION | Facility: CLINIC | Age: 55
End: 2025-02-10
Payer: COMMERCIAL

## 2025-02-20 ENCOUNTER — TRANSFERRED RECORDS (OUTPATIENT)
Dept: HEALTH INFORMATION MANAGEMENT | Facility: CLINIC | Age: 55
End: 2025-02-20
Payer: COMMERCIAL

## 2025-03-20 ENCOUNTER — TRANSFERRED RECORDS (OUTPATIENT)
Dept: HEALTH INFORMATION MANAGEMENT | Facility: CLINIC | Age: 55
End: 2025-03-20
Payer: COMMERCIAL

## 2025-04-06 ENCOUNTER — HEALTH MAINTENANCE LETTER (OUTPATIENT)
Age: 55
End: 2025-04-06

## 2025-04-27 SDOH — HEALTH STABILITY: PHYSICAL HEALTH: ON AVERAGE, HOW MANY MINUTES DO YOU ENGAGE IN EXERCISE AT THIS LEVEL?: 20 MIN

## 2025-04-27 SDOH — HEALTH STABILITY: PHYSICAL HEALTH: ON AVERAGE, HOW MANY DAYS PER WEEK DO YOU ENGAGE IN MODERATE TO STRENUOUS EXERCISE (LIKE A BRISK WALK)?: 5 DAYS

## 2025-04-27 ASSESSMENT — SOCIAL DETERMINANTS OF HEALTH (SDOH): HOW OFTEN DO YOU GET TOGETHER WITH FRIENDS OR RELATIVES?: ONCE A WEEK

## 2025-05-01 ENCOUNTER — OFFICE VISIT (OUTPATIENT)
Dept: FAMILY MEDICINE | Facility: CLINIC | Age: 55
End: 2025-05-01
Payer: COMMERCIAL

## 2025-05-01 VITALS
HEIGHT: 65 IN | BODY MASS INDEX: 30.49 KG/M2 | TEMPERATURE: 97.6 F | OXYGEN SATURATION: 96 % | DIASTOLIC BLOOD PRESSURE: 80 MMHG | HEART RATE: 60 BPM | WEIGHT: 183 LBS | RESPIRATION RATE: 16 BRPM | SYSTOLIC BLOOD PRESSURE: 122 MMHG

## 2025-05-01 DIAGNOSIS — Z00.00 ANNUAL PHYSICAL EXAM: Primary | ICD-10-CM

## 2025-05-01 DIAGNOSIS — Z23 IMMUNIZATION DUE: ICD-10-CM

## 2025-05-01 DIAGNOSIS — R12 HEARTBURN: ICD-10-CM

## 2025-05-01 DIAGNOSIS — I10 PRIMARY HYPERTENSION: ICD-10-CM

## 2025-05-01 DIAGNOSIS — E78.2 MIXED HYPERLIPIDEMIA: ICD-10-CM

## 2025-05-01 LAB
ALBUMIN SERPL BCG-MCNC: 4.5 G/DL (ref 3.5–5.2)
ALP SERPL-CCNC: 86 U/L (ref 40–150)
ALT SERPL W P-5'-P-CCNC: 27 U/L (ref 0–70)
ANION GAP SERPL CALCULATED.3IONS-SCNC: 9 MMOL/L (ref 7–15)
AST SERPL W P-5'-P-CCNC: 33 U/L (ref 0–45)
BASOPHILS # BLD AUTO: 0 10E3/UL (ref 0–0.2)
BASOPHILS NFR BLD AUTO: 1 %
BILIRUB SERPL-MCNC: 0.4 MG/DL
BUN SERPL-MCNC: 20.4 MG/DL (ref 6–20)
CALCIUM SERPL-MCNC: 9.7 MG/DL (ref 8.8–10.4)
CHLORIDE SERPL-SCNC: 105 MMOL/L (ref 98–107)
CHOLEST SERPL-MCNC: 253 MG/DL
CREAT SERPL-MCNC: 0.94 MG/DL (ref 0.67–1.17)
EGFRCR SERPLBLD CKD-EPI 2021: >90 ML/MIN/1.73M2
EOSINOPHIL # BLD AUTO: 0.5 10E3/UL (ref 0–0.7)
EOSINOPHIL NFR BLD AUTO: 8 %
ERYTHROCYTE [DISTWIDTH] IN BLOOD BY AUTOMATED COUNT: 12.3 % (ref 10–15)
FASTING STATUS PATIENT QL REPORTED: YES
GLUCOSE SERPL-MCNC: 85 MG/DL (ref 70–99)
HCO3 SERPL-SCNC: 26 MMOL/L (ref 22–29)
HCT VFR BLD AUTO: 46 % (ref 40–53)
HDLC SERPL-MCNC: 46 MG/DL
HGB BLD-MCNC: 15.1 G/DL (ref 13.3–17.7)
IMM GRANULOCYTES # BLD: 0 10E3/UL
IMM GRANULOCYTES NFR BLD: 0 %
LDLC SERPL CALC-MCNC: 183 MG/DL
LYMPHOCYTES # BLD AUTO: 1.8 10E3/UL (ref 0.8–5.3)
LYMPHOCYTES NFR BLD AUTO: 28 %
MCH RBC QN AUTO: 26.8 PG (ref 26.5–33)
MCHC RBC AUTO-ENTMCNC: 32.8 G/DL (ref 31.5–36.5)
MCV RBC AUTO: 82 FL (ref 78–100)
MONOCYTES # BLD AUTO: 0.6 10E3/UL (ref 0–1.3)
MONOCYTES NFR BLD AUTO: 9 %
NEUTROPHILS # BLD AUTO: 3.5 10E3/UL (ref 1.6–8.3)
NEUTROPHILS NFR BLD AUTO: 54 %
NONHDLC SERPL-MCNC: 207 MG/DL
PLATELET # BLD AUTO: 238 10E3/UL (ref 150–450)
POTASSIUM SERPL-SCNC: 6.8 MMOL/L (ref 3.4–5.3)
PROT SERPL-MCNC: 7.8 G/DL (ref 6.4–8.3)
PSA SERPL DL<=0.01 NG/ML-MCNC: 0.73 NG/ML (ref 0–3.5)
RBC # BLD AUTO: 5.64 10E6/UL (ref 4.4–5.9)
SODIUM SERPL-SCNC: 140 MMOL/L (ref 135–145)
TRIGL SERPL-MCNC: 118 MG/DL
WBC # BLD AUTO: 6.4 10E3/UL (ref 4–11)

## 2025-05-01 RX ORDER — LISINOPRIL 5 MG/1
5 TABLET ORAL DAILY
Qty: 90 TABLET | Refills: 3 | Status: SHIPPED | OUTPATIENT
Start: 2025-05-01

## 2025-05-01 RX ORDER — ATORVASTATIN CALCIUM 10 MG/1
10 TABLET, FILM COATED ORAL AT BEDTIME
Qty: 90 TABLET | Refills: 3 | Status: SHIPPED | OUTPATIENT
Start: 2025-05-01

## 2025-05-01 RX ORDER — PANTOPRAZOLE SODIUM 20 MG/1
20 TABLET, DELAYED RELEASE ORAL DAILY
Qty: 90 TABLET | Refills: 3 | Status: SHIPPED | OUTPATIENT
Start: 2025-05-01

## 2025-05-01 NOTE — PROGRESS NOTES
"Preventive Care Visit  [facility] TARA Weinberg MD, Family Medicine  May 1, 2025      Assessment & Plan     Annual physical exam  Advised patient continue healthy lifestyle.  Check labs and notify with results.  - Comprehensive metabolic panel; Future  - CBC with Platelets & Differential; Future  - Lipid Profile; Future  - Prostate Specific Antigen Screen; Future    Primary hypertension  Controlled.  Refilled lisinopril 5 mg daily.  Check labs to monitor creatinine and potassium.  - lisinopril (ZESTRIL) 5 MG tablet; Take 1 tablet (5 mg) by mouth daily.    Mixed hyperlipidemia  Controlled.  Continue atorvastatin.  Check labs to monitor cholesterol levels.  - atorvastatin (LIPITOR) 10 MG tablet; Take 1 tablet (10 mg) by mouth at bedtime.    Heartburn  Controlled.  Continue pantoprazole.  - pantoprazole (PROTONIX) 20 MG EC tablet; Take 1 tablet (20 mg) by mouth daily.    Immunization due  - ZOSTER RECOMBINANT ADJUVANTED (SHINGRIX)  - Pneumococcal 20 Valent Conjugate (PCV20)      The longitudinal plan of care for the diagnosis(es)/condition(s) as documented were addressed during this visit. Due to the added complexity in care, I will continue to support Amie in the subsequent management and with ongoing continuity of care.      BMI  Estimated body mass index is 30.45 kg/m  as calculated from the following:    Height as of this encounter: 1.651 m (5' 5\").    Weight as of this encounter: 83 kg (183 lb).   Weight management plan: Discussed healthy diet and exercise guidelines    Counseling  Appropriate preventive services were addressed with this patient via screening, questionnaire, or discussion as appropriate for fall prevention, nutrition, physical activity, Tobacco-use cessation, social engagement, weight loss and cognition.  Checklist reviewing preventive services available has been given to the patient.  Reviewed patient's diet, addressing concerns and/or questions.   He is at risk for " psychosocial distress and has been provided with information to reduce risk.           Subjective   Amie is a 54 year old, presenting for the following:  Physical (Patient is here for a physical. Fasting. )        5/1/2025     8:02 AM   Additional Questions   Roomed by jassi irizarry cma   Accompanied by self          HPI    Patient was recently laid off from his job and will be looking for a new job.  He plans to stay in [place], because his wife's family lives in [place] and he has 2 children, one will be a senior in the fall and the other will be a sophomore both in high school.    Exercise: Band and body weight workouts every morning. Stationary bike 5-7 times per week. Soccer once weekly.  Diet: Balanced diet.    HTN: He is tolerating lisinopril 5 mg daily without side effects.    HLD: He is tolerating atorvastatin 10 mg daily without side effects.    GERD: He is taking pantoprazole, which is controlling his GERD symptoms.  Patient had a previous endoscopy that showed hiatal hernia and grade B esophagitis that was negative for Devlin's.        Advance Care Planning    Discussed advance care planning with patient; informed AVS has link to [facility].        4/27/2025   General Health   How would you rate your overall physical health? Good   Feel stress (tense, anxious, or unable to sleep) To some extent   (!) STRESS CONCERN      4/27/2025   Nutrition   Three or more servings of calcium each day? Yes   Diet: Regular (no restrictions)   How many servings of fruit and vegetables per day? (!) 2-3   How many sweetened beverages each day? 0-1         4/27/2025   Exercise   Days per week of moderate/strenous exercise 5 days   Average minutes spent exercising at this level 20 min         4/27/2025   Social Factors   Frequency of gathering with friends or relatives Once a week   Worry food won't last until get money to buy more No   Food not last or not have enough money for food? No   Do you have housing?  (Housing is defined as stable permanent housing and does not include staying outside in a car, in a tent, in an abandoned building, in an overnight shelter, or couch-surfing.) Yes   Are you worried about losing your housing? No   Lack of transportation? No   Unable to get utilities (heat,electricity)? No         4/27/2025   Fall Risk   Fallen 2 or more times in the past year? No   Trouble with walking or balance? No          4/27/2025   Dental   Dentist two times every year? Yes         Today's PHQ-2 Score:       5/1/2025     8:00 AM   PHQ-2 ( 1999 [employer])   Q1: Little interest or pleasure in doing things 1   Q2: Feeling down, depressed or hopeless 1   PHQ-2 Score 2    Q1: Little interest or pleasure in doing things Several days   Q2: Feeling down, depressed or hopeless Several days   PHQ-2 Score 2       Patient-reported           4/27/2025   Substance Use   Alcohol more than 3/day or more than 7/wk No   Do you use any other substances recreationally? No     Social History     Tobacco Use    Smoking status: Never     Passive exposure: Past    Smokeless tobacco: Never   Vaping Use    Vaping status: Never Used   Substance Use Topics    Alcohol use: Yes     Alcohol/week: 2.0 standard drinks of alcohol     Types: 2 Glasses of wine per week     Comment: Only weekends, social    Drug use: Never           4/27/2025   STI Screening   New sexual partner(s) since last STI/HIV test? No   ASCVD Risk   The 10-year ASCVD risk score (Jessica TINSLEY, et al., 2019) is: 5.3%    Values used to calculate the score:      Age: 54 years      Sex: Male      Is Non- : No      Diabetic: No      Tobacco smoker: No      Systolic Blood Pressure: 122 mmHg      Is BP treated: Yes      HDL Cholesterol: 50 mg/dL      Total Cholesterol: 225 mg/dL           Reviewed and updated as needed this visit by Provider                          Review of Systems  Constitutional, HEENT, cardiovascular, pulmonary, gi and gu systems  "are negative, except as otherwise noted.     Objective    Exam  /80 (BP Location: Left arm, Patient Position: Sitting, Cuff Size: Adult Regular)   Pulse 60   Temp 97.6  F (36.4  C) (Temporal)   Resp 16   Ht 1.651 m (5' 5\")   Wt 83 kg (183 lb)   SpO2 96%   BMI 30.45 kg/m     Estimated body mass index is 30.45 kg/m  as calculated from the following:    Height as of this encounter: 1.651 m (5' 5\").    Weight as of this encounter: 83 kg (183 lb).    Physical Exam  GENERAL: alert and no distress  EYES: Eyes grossly normal to inspection, PERRL and conjunctivae and sclerae normal  HENT: ear canals and TM's normal, nose and mouth without ulcers or lesions  NECK: no adenopathy, no asymmetry, masses, or scars  RESP: lungs clear to auscultation - no rales, rhonchi or wheezes  CV: regular rate and rhythm, normal S1 S2, no S3 or S4, no murmur, click or rub, no peripheral edema  ABDOMEN: soft, nontender, no hepatosplenomegaly, no masses and bowel sounds normal  MS: no gross musculoskeletal defects noted, no edema  SKIN: no suspicious lesions or rashes  NEURO: Normal strength and tone, mentation intact and speech normal  PSYCH: mentation appears normal, affect normal/bright        Signed Electronically by: Misael Weinberg MD    "

## 2025-05-20 ENCOUNTER — DOCUMENTATION ONLY (OUTPATIENT)
Dept: OTHER | Facility: CLINIC | Age: 55
End: 2025-05-20
Payer: COMMERCIAL